# Patient Record
(demographics unavailable — no encounter records)

---

## 2025-03-30 NOTE — PHYSICAL EXAM
[General Appearance - Alert] : alert [General Appearance - In No Acute Distress] : in no acute distress [Sclera] : the sclera and conjunctiva were normal [Outer Ear] : the ears and nose were normal in appearance [Neck Appearance] : the appearance of the neck was normal [] : no respiratory distress [Respiration, Rhythm And Depth] : normal respiratory rhythm and effort [Auscultation Breath Sounds / Voice Sounds] : lungs were clear to auscultation bilaterally [Heart Rate And Rhythm] : heart rate was normal and rhythm regular [Heart Sounds] : normal S1 and S2 [Murmurs] : no murmurs [Edema] : there was no peripheral edema [No CVA Tenderness] : no ~M costovertebral angle tenderness [Involuntary Movements] : no involuntary movements were seen [No Focal Deficits] : no focal deficits [Oriented To Time, Place, And Person] : oriented to person, place, and time

## 2025-03-30 NOTE — HISTORY OF PRESENT ILLNESS
Please follow all pre printed instructions given in office. Any questions or problems call [phone]
[FreeTextEntry1] : last seen in 2017 for PCI clearance by dr sanabria/ has been referred for elevated scr  used to work in [employer] and PMD still there  pmhx/psurg hx  - CAD s/p CI x3  - CKD scr 1.6 9 as was in 2017)  - OAB  - OVD with aortic fem-pop bypass 1997 -colon can dx 2014 [facility] dr falk   no recent colonscopy  done    no chemo or xrt  - skin ca  - ccx 2007   soc hx  reformed smoker  continues to work 
[FreeTextEntry1] : last seen in 2017 for PCI clearance by dr sanabria/ has been referred for elevated scr  used to work in [employer] and PMD still there  pmhx/psurg hx  - CAD s/p CI x3  - CKD scr 1.6 9 as was in 2017)  - OAB  - OVD with aortic fem-pop bypass 1997 -colon can dx 2014 [facility] dr falk   no recent colonscopy  done    no chemo or xrt  - skin ca  - ccx 2007   soc hx  reformed smoker  continues to work

## 2025-03-30 NOTE — ASSESSMENT
[FreeTextEntry1] : =============================== # CKD stage 3  # RVH  - stable renal fx  - updated UA and KBUS   results to be discussed over the phone  - inc hydration discussed   pt drinks primarily soda - no nsaids - avoid ivc  6 month fu

## 2025-07-03 NOTE — HISTORY OF PRESENT ILLNESS
[de-identified] : YAMILEX COFFEY is a 78-year-old female presenting today for evaluation of her RT ankle. She fell on 7/2/25. [facility]uated [facility] and diagnosed with ankle fracture. She presents today in wheelchair, wearing ankle brace. She has been weight bearing on injury since being evaluated at Urgent Care. No previous injury/problem with RT ankle.

## 2025-07-03 NOTE — DISCUSSION/SUMMARY
[de-identified] : Pt. has a stable fracture. No surgical indication at this time.  WBAT in SLC with cast shoe.  A well padded short leg fiberglass cast was applied.  Patient was instructed on proper cast management including keeping it dry and not sticking anything down the cast.  Patient was told that if pain significantly increases or toes turn numb and/or blue and simple elevation does not allow symptoms to improve in a few minutes, to call the office immediately or go to the ER.  All questions were answered.  Patient was instructed that they can not operate an automatic vehicle while wearing a CAM boot or cast on the right lower extremity. If operating a vehicle that requires use of a clutch, patient may not drive while wearing a CAM boot or cast on the left lower extremity.  Repeat x-ray will be performed at the next office visit (in cast).

## 2025-07-03 NOTE — PHYSICAL EXAM
[Moderate] : moderate swelling of lateral ankle [Mild] : mild swelling of medial ankle [4___] : eversion 4[unfilled]/5 [2+] : posterior tibialis pulse: 2+ [Normal] : saphenous nerve sensation normal [Right] : right ankle [] : no pain when stressing lateral tarsal metatarsal joint [FreeTextEntry9] : Lateral and stress xrays of the ankle were taken and reviewed today. Minimally displaced lateral malleolus fracture, stress view shows no medial joint space widening.   [TWNoteComboBox7] : dorsiflexion 0 degrees [de-identified] : plantar flexion 30 degrees [de-identified] : inversion 5 degrees [de-identified] : eversion 5 degrees

## 2025-07-17 NOTE — PHYSICAL EXAM
[] : no pain when stressing lateral tarsal metatarsal joint [TWNoteComboBox7] : dorsiflexion 0 degrees [de-identified] : plantar flexion 30 degrees [de-identified] : inversion 5 degrees [de-identified] : eversion 5 degrees [Right] : right ankle [FreeTextEntry3] : Cast is intact.  Normal capillary refill/sensation in toes. No pain with passive/active toe range of motion. Calf is soft/nontender.  No sign of dvt/compartment syndrome.  [FreeTextEntry9] :  AP, mortise and lateral xrays of the ankle were taken and reviewed today. Same mildly displaced lateral malleolus fracture in acceptable alignment.  Mortist intact.

## 2025-07-17 NOTE — DISCUSSION/SUMMARY
[de-identified] : Patient is doing well. She will continue to be wbat in her SLC with the cast shoe. Elevation and activity modification.  Repeat x-ray will be performed at the next office visit (out of cast).

## 2025-07-17 NOTE — PHYSICAL EXAM
[] : no pain when stressing lateral tarsal metatarsal joint [TWNoteComboBox7] : dorsiflexion 0 degrees [de-identified] : plantar flexion 30 degrees [de-identified] : inversion 5 degrees [de-identified] : eversion 5 degrees [Right] : right ankle [FreeTextEntry3] : Cast is intact.  Normal capillary refill/sensation in toes. No pain with passive/active toe range of motion. Calf is soft/nontender.  No sign of dvt/compartment syndrome.  [FreeTextEntry9] :  AP, mortise and lateral xrays of the ankle were taken and reviewed today. Same mildly displaced lateral malleolus fracture in acceptable alignment.  Mortist intact.

## 2025-07-17 NOTE — DISCUSSION/SUMMARY
[de-identified] : Patient is doing well. She will continue to be wbat in her SLC with the cast shoe. Elevation and activity modification.  Repeat x-ray will be performed at the next office visit (out of cast).

## 2025-07-17 NOTE — HISTORY OF PRESENT ILLNESS
[de-identified] : Patient returns for her right lateral malleolus fracture from 7/2/25.  She has been wb in her short leg cast.  Pain is mild/intermittent.  No other complaints.

## 2025-07-17 NOTE — HISTORY OF PRESENT ILLNESS
[de-identified] : Patient returns for her right lateral malleolus fracture from 7/2/25.  She has been wb in her short leg cast.  Pain is mild/intermittent.  No other complaints.